# Patient Record
Sex: MALE | Race: WHITE | Employment: FULL TIME | URBAN - METROPOLITAN AREA
[De-identification: names, ages, dates, MRNs, and addresses within clinical notes are randomized per-mention and may not be internally consistent; named-entity substitution may affect disease eponyms.]

---

## 2021-11-15 ENCOUNTER — HOSPITAL ENCOUNTER (EMERGENCY)
Age: 31
Discharge: HOME | End: 2021-11-15

## 2021-11-15 ENCOUNTER — APPOINTMENT (OUTPATIENT)
Dept: GENERAL RADIOLOGY | Age: 31
End: 2021-11-15

## 2021-11-15 VITALS
WEIGHT: 204 LBS | RESPIRATION RATE: 16 BRPM | OXYGEN SATURATION: 99 % | TEMPERATURE: 97.7 F | SYSTOLIC BLOOD PRESSURE: 150 MMHG | DIASTOLIC BLOOD PRESSURE: 89 MMHG | HEIGHT: 74 IN | BODY MASS INDEX: 26.18 KG/M2 | HEART RATE: 55 BPM

## 2021-11-15 DIAGNOSIS — S86.912A KNEE STRAIN, LEFT, INITIAL ENCOUNTER: Primary | ICD-10-CM

## 2021-11-15 PROCEDURE — 99283 EMERGENCY DEPT VISIT LOW MDM: CPT

## 2021-11-15 PROCEDURE — 73560 X-RAY EXAM OF KNEE 1 OR 2: CPT

## 2021-11-15 ASSESSMENT — ENCOUNTER SYMPTOMS
COLOR CHANGE: 0
COUGH: 0
ABDOMINAL PAIN: 0
NAUSEA: 0
SHORTNESS OF BREATH: 0
RHINORRHEA: 0
SORE THROAT: 0
VOMITING: 0

## 2021-11-15 ASSESSMENT — PAIN DESCRIPTION - PAIN TYPE: TYPE: ACUTE PAIN

## 2021-11-15 ASSESSMENT — PAIN SCALES - GENERAL: PAINLEVEL_OUTOF10: 2

## 2021-11-15 ASSESSMENT — PAIN DESCRIPTION - LOCATION: LOCATION: KNEE

## 2021-11-15 ASSESSMENT — PAIN DESCRIPTION - ORIENTATION: ORIENTATION: LEFT

## 2021-11-15 ASSESSMENT — PAIN DESCRIPTION - DESCRIPTORS: DESCRIPTORS: ACHING

## 2021-11-15 NOTE — ED PROVIDER NOTES
Allergies. Physical Exam     INITIAL VITALS: BP: (!) 150/89, Temp: 97.7 °F (36.5 °C), Pulse: 55, Resp: 16, SpO2: 99 %  Physical Exam  Vitals reviewed. Constitutional:       General: He is not in acute distress. Appearance: He is well-developed and normal weight. HENT:      Head: Normocephalic and atraumatic. Mouth/Throat:      Mouth: Mucous membranes are moist.   Eyes:      Extraocular Movements: Extraocular movements intact. Conjunctiva/sclera: Conjunctivae normal.   Neck:      Trachea: Phonation normal.   Pulmonary:      Effort: Pulmonary effort is normal. No respiratory distress. Musculoskeletal:      Cervical back: Normal range of motion and neck supple. Left knee: No swelling, deformity, effusion, ecchymosis, bony tenderness or crepitus. Normal range of motion. Comments: There is no laxity noted to the knee. The left lower extremity is otherwise unremarkable. Skin:     General: Skin is warm and dry. Findings: No erythema, petechiae or rash. Neurological:      Mental Status: He is alert and oriented to person, place, and time. Psychiatric:         Mood and Affect: Mood and affect normal.         Behavior: Behavior normal.           ED Course     Nursing Notes, Past Medical Hx,Past Surgical Hx, Social Hx, Allergies, and Family Hx were reviewed. The patient was given the following medications:  No orders of the defined types were placed in this encounter. CONSULTS:  None    MEDICAL DECISION MAKING / ASSESSMENT / PLAN     Alejandro South is a 32 y.o. male presenting with complaints of mild pain at the medial left knee after he felt it pop when he stood up from a kneeling position. He thought it may have dislocated. He has a normal-appearing knee with no deformity, bony tenderness, no swelling or effusion and good range of motion. He declined medication. X-ray of the left knee shows early mild degenerative arthritic changes with no acute findings.     The patient was reassured. He may have had a brief patellar dislocation that reduced spontaneously prior to arrival.  He is informed that he may have a mild soft tissue injury that would not be evident on x-ray. He has seen [facility] in the past for his shoulder. He will follow up with them regarding his knee if he notices no improvement over the next 2 to 3 days. He will take over-the-counter medication as needed for pain, he declined an Ace wrap. He will return for worsening. This patient was also evaluated by the attending physician. All care plans were discussed and agreed upon. Clinical Impression     1.  Knee strain, left, initial encounter        Disposition     PATIENT REFERRED TO:  [address] [place] [address] Mathias Moritz 723 [phone]    Call in 3 days  if no improvement      DISCHARGE MEDICATIONS:  New Prescriptions    No medications on file       DISPOSITION Decision To Discharge 11/15/2021 03:01:33 PM      [place], [place]  11/15/21 [id number]

## 2023-03-17 ENCOUNTER — OFFICE VISIT (OUTPATIENT)
Dept: URGENT CARE | Age: 33
End: 2023-03-17

## 2023-03-17 VITALS
HEART RATE: 73 BPM | TEMPERATURE: 98.5 F | WEIGHT: 186 LBS | SYSTOLIC BLOOD PRESSURE: 138 MMHG | HEIGHT: 74 IN | RESPIRATION RATE: 18 BRPM | DIASTOLIC BLOOD PRESSURE: 74 MMHG | BODY MASS INDEX: 23.87 KG/M2 | OXYGEN SATURATION: 94 %

## 2023-03-17 DIAGNOSIS — B96.89 ACUTE BACTERIAL SINUSITIS: Primary | ICD-10-CM

## 2023-03-17 DIAGNOSIS — J45.21 MILD INTERMITTENT ASTHMA WITH ACUTE EXACERBATION: ICD-10-CM

## 2023-03-17 DIAGNOSIS — R03.0 ELEVATED BLOOD PRESSURE READING: ICD-10-CM

## 2023-03-17 DIAGNOSIS — J01.90 ACUTE BACTERIAL SINUSITIS: Primary | ICD-10-CM

## 2023-03-17 LAB
Lab: NORMAL
PERFORMING INSTRUMENT: NORMAL
QC PASS/FAIL: NORMAL
SARS-COV-2, POC: NORMAL

## 2023-03-17 RX ORDER — GUAIFENESIN AND DEXTROMETHORPHAN HYDROBROMIDE 600; 30 MG/1; MG/1
1 TABLET, EXTENDED RELEASE ORAL EVERY 12 HOURS PRN
Qty: 14 TABLET | Refills: 0 | Status: SHIPPED | OUTPATIENT
Start: 2023-03-17 | End: 2023-03-24

## 2023-03-17 RX ORDER — ALBUTEROL SULFATE 90 UG/1
2 AEROSOL, METERED RESPIRATORY (INHALATION) EVERY 6 HOURS PRN
Qty: 1 EACH | Refills: 0 | Status: SHIPPED | OUTPATIENT
Start: 2023-03-17 | End: 2023-04-16

## 2023-03-17 RX ORDER — DOXYCYCLINE HYCLATE 100 MG
100 TABLET ORAL 2 TIMES DAILY
Qty: 14 TABLET | Refills: 0 | Status: SHIPPED | OUTPATIENT
Start: 2023-03-17 | End: 2023-03-24

## 2023-03-17 RX ORDER — ALBUTEROL SULFATE 2.5 MG/3ML
2.5 SOLUTION RESPIRATORY (INHALATION) ONCE
Status: COMPLETED | OUTPATIENT
Start: 2023-03-17 | End: 2023-03-17

## 2023-03-17 RX ADMIN — ALBUTEROL SULFATE 2.5 MG: 2.5 SOLUTION RESPIRATORY (INHALATION) at 17:00

## 2023-03-17 ASSESSMENT — ENCOUNTER SYMPTOMS
GASTROINTESTINAL NEGATIVE: 1
EYES NEGATIVE: 1
SINUS PAIN: 1
SORE THROAT: 0
COUGH: 1
CHEST TIGHTNESS: 0
SINUS PRESSURE: 1
WHEEZING: 0

## 2023-03-17 NOTE — PROGRESS NOTES
Claire Rollins (:  1990) is a 35 y.o. male,New patient, here for evaluation of the following chief complaint(s):  Congestion and Dizziness      ASSESSMENT/PLAN:    ICD-10-CM    1. Acute bacterial sinusitis  J01.90 doxycycline hyclate (VIBRA-TABS) 100 MG tablet    B96.89 Dextromethorphan-guaiFENesin (MUCINEX DM)  MG TB12     POCT COVID-19, Antigen      2. Mild intermittent asthma with acute exacerbation  J45.21 albuterol (PROVENTIL) nebulizer solution 2.5 mg     albuterol sulfate HFA (PROVENTIL HFA) 108 (90 Base) MCG/ACT inhaler     POCT COVID-19, Antigen      3. Elevated blood pressure reading  R03.0 POCT COVID-19, Antigen      POC COVID negative   Reviewed AVS with patient. All questions answered  Discussed elevated blood pressure. Instructed to monitor BP daily and follow up with PCP as needed. Smoking cessation is recommended    Procedure:  HHN treatment given with Albuteral while in clinic  Pre HHN O2 - 94%, HR 73  Post HHN O2 - 97%, HR 80  Decreased wheezing after treatment  Return if symptoms worsen or fail to improve. SUBJECTIVE/OBJECTIVE:  35year old male presents with c/o sinus congestion and fatigue for 6 days. He denies known fever but has had chilled. Has possible sick exposure at work. He was sent by his work to have [employer] testing done. He has treated with OTC cough medication last taken 24 hours ago with mild effectiveness. Has h/o asthma and used his inhaler for symptoms with effectiveness, however, he has run out of inhaler. History provided by:  Patient   used: No      Vitals:    23 1640   BP: 138/74   Pulse: 73   Resp: 18   Temp: 98.5 °F (36.9 °C)   SpO2: 94%   Weight: 186 lb (84.4 kg)   Height: 6' 2\" (1.88 m)       Review of Systems   Constitutional:  Positive for fatigue. Negative for appetite change and fever. HENT:  Positive for congestion, sinus pressure and sinus pain. Negative for ear pain and sore throat.          Mucoid yellow mucous from nose   Eyes: Negative. Respiratory:  Positive for cough. Negative for chest tightness and wheezing. Mucoid  yellow phlegm   Cardiovascular: Negative. Gastrointestinal: Negative. Neurological:  Positive for headaches. Physical Exam  Vitals reviewed. Constitutional:       General: He is not in acute distress. Appearance: Normal appearance. He is obese. He is not ill-appearing. HENT:      Head: Normocephalic. Right Ear: Tympanic membrane, ear canal and external ear normal. There is no impacted cerumen. Left Ear: Tympanic membrane, ear canal and external ear normal. There is no impacted cerumen. Nose: Nasal tenderness, mucosal edema and congestion present. No septal deviation or rhinorrhea. Right Sinus: Maxillary sinus tenderness and frontal sinus tenderness present. Left Sinus: Maxillary sinus tenderness and frontal sinus tenderness present. Mouth/Throat:      Pharynx: Uvula midline. Posterior oropharyngeal erythema present. No pharyngeal swelling, oropharyngeal exudate or uvula swelling. Tonsils: No tonsillar exudate or tonsillar abscesses. 0 on the right. 0 on the left. Eyes:      General:         Right eye: No discharge. Left eye: No discharge. Conjunctiva/sclera: Conjunctivae normal.   Cardiovascular:      Rate and Rhythm: Normal rate and regular rhythm. Pulses: Normal pulses. Heart sounds: Normal heart sounds. Pulmonary:      Effort: Pulmonary effort is normal. No respiratory distress. Breath sounds: No stridor. Wheezing and rhonchi present. Comments: Exp wheezes t/o lungs, Rhonchi to upper lobes anterior, congested cough on exam   Musculoskeletal:      Cervical back: Normal range of motion and neck supple. No tenderness. Lymphadenopathy:      Cervical: No cervical adenopathy. Skin:     General: Skin is warm. Neurological:      Mental Status: He is alert and oriented to person, place, and time. An electronic signature was used to authenticate this note.     --Osiel Tilley, FRANCISCO - CNP

## 2023-03-17 NOTE — LETTER
March 17, 2023       Ezio Rollins YOB: 1990   [address]  [place] [id number] Date of Visit:  3/17/2023       To Whom It May Concern:     Ezio Rollins was seen here today for illness. He tested negative for COVID.    If you have any questions or concerns, please don't hesitate to call.    Sincerely,          Adilia Riojas, APRN - CNP